# Patient Record
Sex: FEMALE | Race: WHITE | NOT HISPANIC OR LATINO | Employment: FULL TIME | URBAN - METROPOLITAN AREA
[De-identification: names, ages, dates, MRNs, and addresses within clinical notes are randomized per-mention and may not be internally consistent; named-entity substitution may affect disease eponyms.]

---

## 2018-03-19 ENCOUNTER — OFFICE VISIT (OUTPATIENT)
Dept: URGENT CARE | Facility: CLINIC | Age: 43
End: 2018-03-19
Payer: COMMERCIAL

## 2018-03-19 VITALS
BODY MASS INDEX: 35.44 KG/M2 | OXYGEN SATURATION: 98 % | TEMPERATURE: 98 F | DIASTOLIC BLOOD PRESSURE: 85 MMHG | HEIGHT: 63 IN | WEIGHT: 200 LBS | SYSTOLIC BLOOD PRESSURE: 137 MMHG | HEART RATE: 79 BPM

## 2018-03-19 DIAGNOSIS — J01.40 ACUTE PANSINUSITIS, RECURRENCE NOT SPECIFIED: Primary | ICD-10-CM

## 2018-03-19 PROCEDURE — 96372 THER/PROPH/DIAG INJ SC/IM: CPT | Mod: S$GLB,,, | Performed by: FAMILY MEDICINE

## 2018-03-19 PROCEDURE — 99203 OFFICE O/P NEW LOW 30 MIN: CPT | Mod: 25,S$GLB,, | Performed by: NURSE PRACTITIONER

## 2018-03-19 RX ORDER — BETAMETHASONE SODIUM PHOSPHATE AND BETAMETHASONE ACETATE 3; 3 MG/ML; MG/ML
9 INJECTION, SUSPENSION INTRA-ARTICULAR; INTRALESIONAL; INTRAMUSCULAR; SOFT TISSUE
Status: COMPLETED | OUTPATIENT
Start: 2018-03-19 | End: 2018-03-19

## 2018-03-19 RX ORDER — AMOXICILLIN AND CLAVULANATE POTASSIUM 875; 125 MG/1; MG/1
1 TABLET, FILM COATED ORAL 2 TIMES DAILY
Qty: 14 TABLET | Refills: 0 | Status: SHIPPED | OUTPATIENT
Start: 2018-03-19 | End: 2018-03-26

## 2018-03-19 RX ORDER — BENZONATATE 200 MG/1
200 CAPSULE ORAL 3 TIMES DAILY PRN
Qty: 30 CAPSULE | Refills: 0 | Status: SHIPPED | OUTPATIENT
Start: 2018-03-19 | End: 2019-03-19

## 2018-03-19 RX ORDER — PRAVASTATIN SODIUM 20 MG/1
20 TABLET ORAL DAILY
COMMUNITY

## 2018-03-19 RX ADMIN — BETAMETHASONE SODIUM PHOSPHATE AND BETAMETHASONE ACETATE 9 MG: 3; 3 INJECTION, SUSPENSION INTRA-ARTICULAR; INTRALESIONAL; INTRAMUSCULAR; SOFT TISSUE at 06:03

## 2018-03-19 NOTE — PROGRESS NOTES
"Subjective:       Patient ID: Beatriz Neville is a 42 y.o. female.    Vitals:  height is 5' 3" (1.6 m) and weight is 90.7 kg (200 lb). Her temperature is 98.4 °F (36.9 °C). Her blood pressure is 137/85 and her pulse is 79. Her oxygen saturation is 98%.     Chief Complaint: Sinus Problem    Sinus Problem   This is a new problem. The current episode started 1 to 4 weeks ago. The problem has been gradually worsening since onset. There has been no fever. Associated symptoms include congestion, coughing, ear pain, headaches, a hoarse voice, sinus pressure, sneezing, a sore throat and swollen glands. Pertinent negatives include no chills or shortness of breath. Past treatments include spray decongestants (mucinex/flonase/ibuprofen ). The treatment provided no relief.     Review of Systems   Constitution: Negative for chills, fever and malaise/fatigue.   HENT: Positive for congestion, ear pain, hoarse voice, sinus pressure, sneezing and sore throat.    Eyes: Negative for discharge and redness.   Cardiovascular: Negative for chest pain, dyspnea on exertion and leg swelling.   Respiratory: Positive for cough and sputum production. Negative for shortness of breath and wheezing.    Musculoskeletal: Negative for myalgias.   Gastrointestinal: Negative for abdominal pain and nausea.   Neurological: Positive for headaches.   All other systems reviewed and are negative.      Objective:      Physical Exam   Constitutional: She is oriented to person, place, and time. She appears well-developed and well-nourished. She is cooperative.  Non-toxic appearance. She does not appear ill. No distress.   HENT:   Head: Normocephalic and atraumatic.   Right Ear: Hearing, external ear and ear canal normal. A middle ear effusion is present.   Left Ear: Hearing, external ear and ear canal normal. A middle ear effusion is present.   Nose: Mucosal edema and rhinorrhea present. No nasal deformity. No epistaxis. Right sinus exhibits maxillary sinus " tenderness and frontal sinus tenderness. Left sinus exhibits maxillary sinus tenderness and frontal sinus tenderness.   Mouth/Throat: Uvula is midline, oropharynx is clear and moist and mucous membranes are normal. No trismus in the jaw. Normal dentition. No uvula swelling. No posterior oropharyngeal erythema.   Eyes: Conjunctivae and lids are normal. No scleral icterus.   Sclera clear bilat   Neck: Trachea normal, full passive range of motion without pain and phonation normal. Neck supple.   Cardiovascular: Normal rate, regular rhythm, normal heart sounds, intact distal pulses and normal pulses.    Pulmonary/Chest: Effort normal and breath sounds normal. No respiratory distress.   Abdominal: Soft. Normal appearance and bowel sounds are normal. She exhibits no distension. There is no tenderness.   Musculoskeletal: Normal range of motion. She exhibits no edema or deformity.   Lymphadenopathy:        Head (right side): Submandibular and preauricular adenopathy present. No submental, no tonsillar and no posterior auricular adenopathy present.        Head (left side): Submandibular and preauricular adenopathy present. No submental, no tonsillar and no posterior auricular adenopathy present.   Neurological: She is alert and oriented to person, place, and time. She exhibits normal muscle tone. Coordination normal.   Skin: Skin is warm, dry and intact. She is not diaphoretic. No pallor.   Psychiatric: She has a normal mood and affect. Her speech is normal and behavior is normal. Judgment and thought content normal. Cognition and memory are normal.   Nursing note and vitals reviewed.      Assessment:       1. Acute pansinusitis, recurrence not specified        Plan:         Acute pansinusitis, recurrence not specified    Other orders  -     amoxicillin-clavulanate 875-125mg (AUGMENTIN) 875-125 mg per tablet; Take 1 tablet by mouth 2 (two) times daily.  Dispense: 14 tablet; Refill: 0  -     benzonatate (TESSALON) 200 MG  capsule; Take 1 capsule (200 mg total) by mouth 3 (three) times daily as needed.  Dispense: 30 capsule; Refill: 0  -     betamethasone acetate-betamethasone sodium phosphate injection 9 mg; Inject 1.5 mLs (9 mg total) into the muscle one time.

## 2018-03-19 NOTE — PATIENT INSTRUCTIONS
Acute Bacterial Rhinosinusitis (ABRS)    Acute bacterial rhinosinusitis (ABRS) is an infection of your nasal cavity and sinuses. Its caused by bacteria. Acute means that youve had symptoms for less than 12 weeks.  Understanding your sinuses  The nasal cavity is the large air-filled space behind your nose. The sinuses are a group of spaces formed by the bones of your face. They connect with your nasal cavity. ABRS causes the tissue lining these spaces to become inflamed. Mucus may not drain normally. This leads to facial pain and other symptoms.  What causes ABRS?  ABRS most often follows an upper respiratory infection caused by a virus. Bacteria then infect the lining of your nasal cavity and sinuses. But you can also get ABRS if you have:  · Nasal allergies  · Long-term nasal swelling and congestion not caused by allergies  · Blockage in the nose  Symptoms of ABRS  The symptoms of ABRS may be different for each person, and can include:  · Nasal congestion  · Runny nose  · Fluid draining from the nose down the throat (postnasal drip)  · Headache  · Cough  · Pain in the sinuses  · Thick, colored fluid from the nose (mucus)  · Fever  Diagnosing ABRS  ABRS may be diagnosed if youve had an upper respiratory infection like a cold and cough for longer than 10 to 14 days. Your health care provider will ask about your symptoms and your medical history. The provider will check your vital signs, including your temperature. Youll have a physical exam. The health care provider will check your ears, nose, and throat. You likely wont need any tests. If ABRS comes back, you may have a culture or other tests.  Treatment for ABRS  Treatment may include:  · Antibiotic medicine. This is for symptoms that last for at least 10 to 14 days.  · Nasal corticosteroid medicine. Drops or spray used in the nose can lessen swelling and congestion.  · Over-the-counter pain medicine. This is to lessen sinus pain and pressure.  · Nasal  decongestant medicine. Spray or drops may help to lessen congestion. Do not use them for more than a few days.  · Salt wash (saline irrigation). This can help to loosen mucus.  Possible complications of ABRS  ABRS may come back or become long-term (chronic).  In rare cases, ABRS may cause complications such as:   · Inflamed tissue around the brain and spinal cord (meningitis)  · Inflamed tissue around the eyes (orbital cellulitis)  · Inflamed bones around the sinuses (osteitis)  These problems may need to be treated in a hospital with intravenous (IV) antibiotic medicine or surgery.  When to call the health care provider  Call your health care provider if you have any of the following:  · Symptoms that dont get better, or get worse  · Symptoms that dont get better after 3 to 5 days on antibiotics  · Trouble seeing  · Swelling around your eyes  · Confusion or trouble staying awake   Date Last Reviewed: 3/3/2015  © 2699-5550 [employer]. [address] [place] [address]. All rights reserved. This information is not intended as a substitute for professional medical care. Always follow your healthcare professional's instructions.    Symptomatic treatment:    Tylenol every 4 hours  Ibuprofen ever 6 hours  salt water gargles  Cold-eeze helps to reduce the duration of sore throat symptoms  Cepachol helps to numb the discomfort  Chloroseptic spray  Nasal saline spray reduces inflammation and dryness  Warm face compresses as often as you can  Vicks vapor rub at night  Flonase OTC or Nasacort OTC  Simple foods like chicken noodle soup help  Delsym helps with coughing at night  Zyrtec/Claritin during the day and Benadryl at night may help if allergy component   Rest as much as you can                                                          If we discussed that I think your illness is viral it will not respond to antibiotics and it will last 10-14 days.  Flonase (fluticasone) is a nasal spray which is  "available over the counter and may help with your symptoms   Zyrtec D, Claritin D or allegra D can also help with symptoms of congestion and drainage.   If you have hypertension avoid using the "D" which is the decongestant   If you just have drainage you can take plain zyrtec, claritin or allegra   If you just have a congested feeling you can take pseudoephedrine (unless you have high blood pressure) which you have to sign for behind the counter. Do not buy the phenylephrine which is on the shelf as it is not effective   Tylenol or ibuprofen can also be used as directed for pain unless you have an allergy to them or medical condition such as stomach ulcers, kidney or liver disease or blood thinners etc for which you should not be taking these type of medications.   If you are flying in the next few days Afrin nose drops for the airplane flight upon take off and landing may help. Other than at those times refrain from using afrin.       Please arrange follow up with your primary medical clinic as soon as possible. You must understand that you've received an Urgent Care treatment only and that you may be released before all of your medical problems are known or treated. You, the patient, will arrange for follow up as instructed. If your symptoms worsen or fail to improve you should go to the Emergency Room.      "

## 2023-07-21 ENCOUNTER — HOSPITAL ENCOUNTER (EMERGENCY)
Facility: HOSPITAL | Age: 48
Discharge: HOME | End: 2023-07-21
Attending: INTERNAL MEDICINE

## 2023-07-21 VITALS
SYSTOLIC BLOOD PRESSURE: 185 MMHG | WEIGHT: 205 LBS | DIASTOLIC BLOOD PRESSURE: 90 MMHG | TEMPERATURE: 98 F | HEART RATE: 76 BPM | OXYGEN SATURATION: 100 % | RESPIRATION RATE: 18 BRPM | BODY MASS INDEX: 36.32 KG/M2 | HEIGHT: 63 IN

## 2023-07-21 DIAGNOSIS — R00.2 PALPITATIONS: ICD-10-CM

## 2023-07-21 DIAGNOSIS — I10 HYPERTENSION, UNSPECIFIED TYPE: Primary | ICD-10-CM

## 2023-07-21 LAB
ALBUMIN SERPL-MCNC: 4.3 G/DL (ref 3.3–5.5)
ALP SERPL-CCNC: 73 U/L (ref 42–141)
B-HCG UR QL: NEGATIVE
BILIRUB SERPL-MCNC: 1 MG/DL (ref 0.2–1.6)
BUN SERPL-MCNC: 12 MG/DL (ref 7–22)
CALCIUM SERPL-MCNC: 9.9 MG/DL (ref 8–10.3)
CHLORIDE SERPL-SCNC: 104 MMOL/L (ref 98–108)
CREAT SERPL-MCNC: 0.7 MG/DL (ref 0.6–1.2)
CTP QC/QA: YES
GLUCOSE SERPL-MCNC: 106 MG/DL (ref 73–118)
POC ALT (SGPT): 19 U/L (ref 10–47)
POC AST (SGOT): 23 U/L (ref 11–38)
POC B-TYPE NATRIURETIC PEPTIDE: 19 PG/ML (ref 0–100)
POC CARDIAC TROPONIN I: 0 NG/ML (ref 0–0.08)
POC TCO2: 27 MMOL/L (ref 18–33)
POTASSIUM BLD-SCNC: 3.7 MMOL/L (ref 3.6–5.1)
PROTEIN, POC: 7.8 G/DL (ref 6.4–8.1)
SAMPLE: NORMAL
SODIUM BLD-SCNC: 142 MMOL/L (ref 128–145)

## 2023-07-21 PROCEDURE — 81025 URINE PREGNANCY TEST: CPT | Mod: ER

## 2023-07-21 PROCEDURE — 84484 ASSAY OF TROPONIN QUANT: CPT | Mod: ER

## 2023-07-21 PROCEDURE — 93010 ELECTROCARDIOGRAM REPORT: CPT | Mod: ,,, | Performed by: INTERNAL MEDICINE

## 2023-07-21 PROCEDURE — 99284 EMERGENCY DEPT VISIT MOD MDM: CPT | Mod: 25,ER

## 2023-07-21 PROCEDURE — 25000003 PHARM REV CODE 250: Mod: ER | Performed by: INTERNAL MEDICINE

## 2023-07-21 PROCEDURE — 80053 COMPREHEN METABOLIC PANEL: CPT | Mod: ER

## 2023-07-21 PROCEDURE — 83880 ASSAY OF NATRIURETIC PEPTIDE: CPT | Mod: ER

## 2023-07-21 PROCEDURE — 93010 EKG 12-LEAD: ICD-10-PCS | Mod: ,,, | Performed by: INTERNAL MEDICINE

## 2023-07-21 PROCEDURE — 81025 URINE PREGNANCY TEST: CPT | Mod: ER | Performed by: NURSE PRACTITIONER

## 2023-07-21 PROCEDURE — 93005 ELECTROCARDIOGRAM TRACING: CPT | Mod: ER

## 2023-07-21 RX ORDER — LOSARTAN POTASSIUM 50 MG/1
50 TABLET ORAL DAILY
Qty: 90 TABLET | Refills: 3 | Status: SHIPPED | OUTPATIENT
Start: 2023-07-21 | End: 2024-07-20

## 2023-07-21 RX ORDER — LOSARTAN POTASSIUM 25 MG/1
50 TABLET ORAL
Status: COMPLETED | OUTPATIENT
Start: 2023-07-21 | End: 2023-07-21

## 2023-07-21 RX ADMIN — LOSARTAN POTASSIUM 50 MG: 25 TABLET, FILM COATED ORAL at 07:07

## 2023-07-22 NOTE — ED PROVIDER NOTES
Encounter Date: 2023    SCRIBE #1 NOTE: I, Yordan Calderon, am scribing for, and in the presence of,  Ted Hays MD. I have scribed the following portions of the note - Other sections scribed: HPI, ROS, PE.     History     Chief Complaint   Patient presents with    Hypertension     Pt report HTN since this morning. Pt checked BP twice with elevated BP. Adds palpation and hot flashes for the past 3 days. Denies hx of HTN. Denies n/v/diarrhea.      48 year old female with no significant past medical history presenting to the Emergency room for further evaluation of light headedness, generalized malaise, and subjective fever for 3 days. The patient states she has had a few elevated BP readings while at DR's visits, but she is not on any BP medications. No PCP. No other exacerbating or alleviating factors. NKDA. Denies other symptoms. No further complaints at present time.     The history is provided by the patient. No  was used.   Review of patient's allergies indicates:  No Known Allergies  Past Medical History:   Diagnosis Date    Hypothyroidism      Past Surgical History:   Procedure Laterality Date    APPENDECTOMY       SECTION      lt breast bx  2005    TONSILLECTOMY       No family history on file.  Social History     Tobacco Use    Smoking status: Never    Smokeless tobacco: Never   Substance Use Topics    Alcohol use: No    Drug use: No     Review of Systems   Constitutional:  Negative for activity change, appetite change, chills, fatigue and fever.        + Subjective fever  + Generalized malaise     HENT:  Negative for congestion, sore throat, trouble swallowing and voice change.    Eyes:  Negative for photophobia, pain, redness and visual disturbance.   Respiratory:  Negative for cough, chest tightness, shortness of breath and wheezing.    Cardiovascular:  Negative for chest pain, palpitations and leg swelling.   Gastrointestinal:  Negative for abdominal distention,  abdominal pain, anal bleeding, constipation, diarrhea, nausea and vomiting.   Endocrine: Negative for polydipsia, polyphagia and polyuria.   Genitourinary:  Negative for difficulty urinating, dysuria, frequency, hematuria, urgency, vaginal bleeding and vaginal discharge.   Musculoskeletal:  Negative for arthralgias and myalgias.   Skin:  Negative for rash and wound.   Neurological:  Positive for light-headedness. Negative for dizziness, weakness and headaches.   Hematological:  Negative for adenopathy.     Physical Exam     Initial Vitals   BP Pulse Resp Temp SpO2   07/21/23 1635 07/21/23 1635 07/21/23 1635 07/21/23 1636 07/21/23 1635   (!) 186/120 97 18 98.3 °F (36.8 °C) 100 %      MAP       --                Physical Exam    Nursing note and vitals reviewed.  Constitutional: She appears well-developed and well-nourished.   HENT:   Head: Normocephalic and atraumatic.   Right Ear: External ear normal.   Left Ear: External ear normal.   Eyes: Conjunctivae are normal.   Neck: Phonation normal. Neck supple.   Normal range of motion.  Cardiovascular:  Normal rate and intact distal pulses.           Pulmonary/Chest: Effort normal. No stridor. No respiratory distress.   Abdominal: Abdomen is soft. There is no abdominal tenderness.   Musculoskeletal:         General: No tenderness or edema.      Cervical back: Normal range of motion and neck supple.     Neurological: She is alert and oriented to person, place, and time.   Skin: Skin is warm and dry. No rash noted.   Psychiatric: She has a normal mood and affect. Her behavior is normal.       ED Course   Procedures  Labs Reviewed   TROPONIN ISTAT   POCT URINE PREGNANCY   POCT CBC   POCT CMP   POCT TROPONIN   POCT B-TYPE NATRIURETIC PEPTIDE (BNP)   POCT CMP   POCT B-TYPE NATRIURETIC PEPTIDE (BNP)          Imaging Results              X-Ray Chest PA And Lateral (Final result)  Result time 07/21/23 17:56:29      Final result by Edilberto Condon MD (07/21/23 17:56:29)                    Impression:      1. No acute cardiopulmonary process.      Electronically signed by: Edilberto Condon MD  Date:    07/21/2023  Time:    17:56               Narrative:    EXAMINATION:  XR CHEST PA AND LATERAL    CLINICAL HISTORY:  Palpitations    TECHNIQUE:  PA and lateral views of the chest were performed.    COMPARISON:  None    FINDINGS:  The cardiomediastinal silhouette is not enlarged.  There is no pleural effusion.  The trachea is midline.  The lungs are symmetrically expanded bilaterally without evidence of acute parenchymal process. No large focal consolidation seen.  There is no pneumothorax.  The osseous structures are unremarkable.                                       Medications   losartan tablet 50 mg (50 mg Oral Given 7/21/23 1953)     Medical Decision Making:   History:   Old Medical Records: I decided to obtain old medical records.  Initial Assessment:   48 year old female with no significant past medical history presenting to the Emergency room for further evaluation of light headedness, generalized malaise, and subjective fever for 3 days. The patient states she has had a few elevated BP readings while at DR's visits, but she is not on any BP medications. No PCP. No other exacerbating or alleviating factors. NKDA. Denies other symptoms. No further complaints at present time.   Clinical Tests:   Lab Tests: Ordered and Reviewed  Radiological Study: Ordered and Reviewed  Medical Tests: Ordered and Reviewed  ED Management:  Initial blood pressure was recorded at 186/120.  Repeat blood pressure upon arrival to and TM room was 185/90.  During history, patient stated she received at least 2 elevated blood pressure readings from specialty clinics, but was never started on medication.  Patient was given a prescription for losartan and advised to follow-up with her primary care physician within the next 3 days for re-evaluation/return to the emergency department if condition worsens.  Instructions  for hypertension were given prior to discharge.      This document was produced by a scribe under my direction and in my presence. I agree with the content of the note and have made any necessary edits.     Dr. Hays    07/22/2023 4:04 AM        Scribe Attestation:   Scribe #1: I performed the above scribed service and the documentation accurately describes the services I performed. I attest to the accuracy of the note.                   Clinical Impression:   Final diagnoses:  [R00.2] Palpitations  [I10] Hypertension, unspecified type (Primary)        ED Disposition Condition    Discharge Stable          ED Prescriptions       Medication Sig Dispense Start Date End Date Auth. Provider    losartan (COZAAR) 50 MG tablet Take 1 tablet (50 mg total) by mouth once daily. 90 tablet 7/21/2023 7/20/2024 Ted Hays MD          Follow-up Information    None          Ted Hays MD  07/22/23 [id number]

## 2023-07-22 NOTE — DISCHARGE INSTRUCTIONS
Follow-up with your primary care physician within the next week for re-evaluation and return to the emergency department condition worsens.

## 2023-07-22 NOTE — ED TRIAGE NOTES
Beatriz Neville, a 48 y.o. female presents to the ED w/ complaint of having high blood pressure readings starting this morning.  She was having hot flashes and headache and took her pressure with readings of 168/104 and the second one was even higher.  She denies any h/o hypertension and she denies chest pain or sob.     Triage note:  Chief Complaint   Patient presents with    Hypertension     Pt report HTN since this morning. Pt checked BP twice with elevated BP. Adds palpation and hot flashes for the past 3 days. Denies hx of HTN. Denies n/v/diarrhea.      Review of patient's allergies indicates:  No Known Allergies  Past Medical History:   Diagnosis Date    Hypothyroidism

## 2024-09-24 ENCOUNTER — OFFICE VISIT (OUTPATIENT)
Dept: OBSTETRICS AND GYNECOLOGY | Facility: CLINIC | Age: 49
End: 2024-09-24
Payer: COMMERCIAL

## 2024-09-24 VITALS
BODY MASS INDEX: 36.28 KG/M2 | WEIGHT: 204.81 LBS | DIASTOLIC BLOOD PRESSURE: 83 MMHG | SYSTOLIC BLOOD PRESSURE: 130 MMHG

## 2024-09-24 DIAGNOSIS — Z12.39 ENCOUNTER FOR SCREENING FOR MALIGNANT NEOPLASM OF BREAST, UNSPECIFIED SCREENING MODALITY: ICD-10-CM

## 2024-09-24 DIAGNOSIS — Z01.419 WELL WOMAN EXAM WITH ROUTINE GYNECOLOGICAL EXAM: Primary | ICD-10-CM

## 2024-09-24 DIAGNOSIS — Z12.4 CERVICAL CANCER SCREENING: ICD-10-CM

## 2024-09-24 DIAGNOSIS — N92.0 MENORRHAGIA WITH REGULAR CYCLE: ICD-10-CM

## 2024-09-24 PROCEDURE — 99999 PR PBB SHADOW E&M-EST. PATIENT-LVL III: CPT | Mod: PBBFAC,,, | Performed by: OBSTETRICS & GYNECOLOGY

## 2024-09-24 PROCEDURE — 3079F DIAST BP 80-89 MM HG: CPT | Mod: CPTII,S$GLB,, | Performed by: OBSTETRICS & GYNECOLOGY

## 2024-09-24 PROCEDURE — 3075F SYST BP GE 130 - 139MM HG: CPT | Mod: CPTII,S$GLB,, | Performed by: OBSTETRICS & GYNECOLOGY

## 2024-09-24 PROCEDURE — 3008F BODY MASS INDEX DOCD: CPT | Mod: CPTII,S$GLB,, | Performed by: OBSTETRICS & GYNECOLOGY

## 2024-09-24 PROCEDURE — 4010F ACE/ARB THERAPY RXD/TAKEN: CPT | Mod: CPTII,S$GLB,, | Performed by: OBSTETRICS & GYNECOLOGY

## 2024-09-24 PROCEDURE — 1159F MED LIST DOCD IN RCRD: CPT | Mod: CPTII,S$GLB,, | Performed by: OBSTETRICS & GYNECOLOGY

## 2024-09-24 PROCEDURE — 99396 PREV VISIT EST AGE 40-64: CPT | Mod: S$GLB,,, | Performed by: OBSTETRICS & GYNECOLOGY

## 2024-09-24 RX ORDER — METOPROLOL SUCCINATE 25 MG/1
25 TABLET, EXTENDED RELEASE ORAL DAILY
COMMUNITY
Start: 2024-07-10

## 2024-09-24 RX ORDER — HYDROCHLOROTHIAZIDE 25 MG/1
25 TABLET ORAL
COMMUNITY
Start: 2024-08-08

## 2024-09-24 NOTE — PROGRESS NOTES
HISTORY OF PRESENT ILLNESS:    Beatriz Neville is a 49 y.o. female, , Patient's last menstrual period was 09/10/2024 (exact date).,  presents for a routine annual exam . Complaining of menorrhagia ,with clots ,occ messes up clots   Last pap:  1 year   Contraception: tubal ligation.    Sexually transmitted infection risk: no risk  Menstrual flow: regular every 28-30 days.  This is the extent of the patient's complaints at this time.     Past Medical History:   Diagnosis Date    Hypothyroidism        Past Surgical History:   Procedure Laterality Date    APPENDECTOMY       SECTION      lt breast bx  2005    TONSILLECTOMY         MEDICATIONS AND ALLERGIES:      Current Outpatient Medications:     hydroCHLOROthiazide (HYDRODIURIL) 25 MG tablet, Take 25 mg by mouth., Disp: , Rfl:     levothyroxine (SYNTHROID) 75 MCG tablet, Take 75 mcg by mouth once daily., Disp: , Rfl:     losartan (COZAAR) 50 MG tablet, Take 1 tablet (50 mg total) by mouth once daily., Disp: 90 tablet, Rfl: 3    metoprolol succinate (TOPROL-XL) 25 MG 24 hr tablet, Take 25 mg by mouth once daily., Disp: , Rfl:     pravastatin (PRAVACHOL) 20 MG tablet, Take 20 mg by mouth once daily., Disp: , Rfl:     fluticasone (FLONASE) 50 mcg/actuation nasal spray, One to two sprays each nostril daily (Patient not taking: Reported on 2024), Disp: 16 g, Rfl: 1    loratadine (CLARITIN) 10 mg tablet, Take 1 tablet (10 mg total) by mouth once daily. (Patient not taking: Reported on 2024), Disp: 30 tablet, Rfl: 0    Review of patient's allergies indicates:  No Known Allergies    Family History   Problem Relation Name Age of Onset    Lung cancer Paternal Grandfather      Lung cancer Paternal Grandmother         Social History     Socioeconomic History    Marital status:    Tobacco Use    Smoking status: Never    Smokeless tobacco: Never   Substance and Sexual Activity    Alcohol use: No    Drug use: No    Sexual activity: Yes     Partners: Male      Social Determinants of Health     Financial Resource Strain: Low Risk  (2024)    Received from [facility]    Overall Financial Resource Strain (CARDIA)     Difficulty of Paying Living Expenses: Not very hard   Food Insecurity: No Food Insecurity (2024)    Received from [facility]    Hunger Vital Sign     Worried About Running Out of Food in the Last Year: Never true     Ran Out of Food in the Last Year: Never true   Transportation Needs: No Transportation Needs (2024)    Received from [facility]    PRAPARE - Transportation     Lack of Transportation (Medical): No     Lack of Transportation (Non-Medical): No   Physical Activity: Inactive (2024)    Received from [facility]    Exercise Vital Sign     Days of Exercise per Week: 0 days     Minutes of Exercise per Session: 0 min   Stress: Stress Concern Present (2024)    Received from [facility]    [place] [employer] - Occupational Stress Questionnaire     Feeling of Stress : Very much       OB History    Para Term  AB Living   3 3 3     3   SAB IAB Ectopic Multiple Live Births           3      # Outcome Date GA Lbr Thiago/2nd Weight Sex Type Anes PTL Lv   3 Term 2010    F CS-Unspec  N FRANTZ   2 Term 2006    M CS-Unspec  N FRANTZ   1 Term 2004    F CS-Unspec  N FRANTZ          COMPREHENSIVE GYN HISTORY:  PAP History: Denies abnormal Paps.  Infection History: Denies STDs. Denies PID.  Benign History: Denies uterine fibroids. Denies ovarian cysts. Denies endometriosis. Denies other conditions.  Cancer History: Denies cervical cancer. Denies uterine cancer or hyperplasia. Denies ovarian cancer. Denies vulvar cancer or pre-cancer. Denies vaginal cancer or pre-cancer. Denies breast cancer. Denies colon cancer.      ROS:  GENERAL: No weight changes. No swelling. No fatigue. No fever.  BREASTS: No pain. No lumps. No discharge.  ABDOMEN: No pain. No nausea. No vomiting. No diarrhea. No constipation.  REPRODUCTIVE: No abnormal  bleeding. No pelvic pain.   VULVA: No pain. No lesions. No itching.  VAGINA: No relaxation. No itching. No odor. No discharge. No lesions.  URINARY: No incontinence. No nocturia. No frequency. No dysuria.    /83   Wt 92.9 kg (204 lb 12.9 oz)   LMP 09/10/2024 (Exact Date)   BMI 36.28 kg/m²     PE:  Physical Exam   Vitals signs and nursing note reviewed.   Constitutional:       Appearance: Normal appearance.   HENT:      Head: Normocephalic and atraumatic.      Nose: Nose normal.      Mouth/Throat:      Mouth: Mucous membranes are moist.   Eyes:      Conjunctiva/sclera: Conjunctivae normal.   Neck:      Musculoskeletal: Normal range of motion.   Pulmonary:      Effort: Pulmonary effort is normal.   Breast: no masses palpated  bilateral    Abdominal:      Palpations: Abdomen is soft.   Genitourinary:     General: Normal vulva.      Vagina: No signs of injury and foreign body. No vaginal discharge, erythema, tenderness, bleeding, lesions or prolapsed vaginal walls.      Cervix: Normal. Pap taken     Uterus: Normal.       Adnexa: Right adnexa normal and left adnexa normal.      Musculoskeletal: Normal range of motion.   Skin:     General: Skin is warm and dry.   Neurological:      General: No focal deficit present.      Mental Status: She is alert.   Psychiatric:         Mood and Affect: Mood normal.         Behavior: Behavior normal.         Thought Content: Thought content normal.         Judgment: Judgment normal.    PROCEDURES/ORDERS:  Pap      Assessment/Plan:    Well woman exam with routine gynecological exam    Cervical cancer screening    Encounter for screening for malignant neoplasm of breast, unspecified screening modality    Menorrhagia with regular cycle              COUNSELING:  The patient was counseled today on:  -A.C.S. Pap and pelvic exam guidelines, recomendations for yearly mammogram, monthly self breast exams and to follow up with her PCP for other health maintenance.    FOLLOW-UP  annually  for [place].

## 2024-10-30 ENCOUNTER — PATIENT MESSAGE (OUTPATIENT)
Dept: OBSTETRICS AND GYNECOLOGY | Facility: CLINIC | Age: 49
End: 2024-10-30
Payer: COMMERCIAL

## 2025-02-24 LAB — BCS RECOMMENDATION EXT: NORMAL

## 2025-03-06 ENCOUNTER — PATIENT OUTREACH (OUTPATIENT)
Dept: ADMINISTRATIVE | Facility: HOSPITAL | Age: 50
End: 2025-03-06
Payer: COMMERCIAL

## 2025-03-06 ENCOUNTER — OFFICE VISIT (OUTPATIENT)
Dept: FAMILY MEDICINE | Facility: CLINIC | Age: 50
End: 2025-03-06
Payer: COMMERCIAL

## 2025-03-06 VITALS
HEIGHT: 63 IN | TEMPERATURE: 99 F | HEART RATE: 68 BPM | BODY MASS INDEX: 38.01 KG/M2 | OXYGEN SATURATION: 97 % | DIASTOLIC BLOOD PRESSURE: 76 MMHG | WEIGHT: 214.5 LBS | SYSTOLIC BLOOD PRESSURE: 134 MMHG

## 2025-03-06 DIAGNOSIS — E03.9 HYPOTHYROIDISM, UNSPECIFIED TYPE: ICD-10-CM

## 2025-03-06 DIAGNOSIS — Z12.31 SCREENING MAMMOGRAM FOR BREAST CANCER: Primary | ICD-10-CM

## 2025-03-06 DIAGNOSIS — I10 HYPERTENSION, UNSPECIFIED TYPE: Primary | ICD-10-CM

## 2025-03-06 DIAGNOSIS — Z12.11 SCREENING FOR COLON CANCER: ICD-10-CM

## 2025-03-06 DIAGNOSIS — E78.5 HYPERLIPIDEMIA, UNSPECIFIED HYPERLIPIDEMIA TYPE: ICD-10-CM

## 2025-03-06 PROCEDURE — 99204 OFFICE O/P NEW MOD 45 MIN: CPT | Mod: S$GLB,,, | Performed by: FAMILY MEDICINE

## 2025-03-06 PROCEDURE — 1160F RVW MEDS BY RX/DR IN RCRD: CPT | Mod: CPTII,S$GLB,, | Performed by: FAMILY MEDICINE

## 2025-03-06 PROCEDURE — 1159F MED LIST DOCD IN RCRD: CPT | Mod: CPTII,S$GLB,, | Performed by: FAMILY MEDICINE

## 2025-03-06 PROCEDURE — 3078F DIAST BP <80 MM HG: CPT | Mod: CPTII,S$GLB,, | Performed by: FAMILY MEDICINE

## 2025-03-06 PROCEDURE — 3008F BODY MASS INDEX DOCD: CPT | Mod: CPTII,S$GLB,, | Performed by: FAMILY MEDICINE

## 2025-03-06 PROCEDURE — 3075F SYST BP GE 130 - 139MM HG: CPT | Mod: CPTII,S$GLB,, | Performed by: FAMILY MEDICINE

## 2025-03-06 PROCEDURE — 4010F ACE/ARB THERAPY RXD/TAKEN: CPT | Mod: CPTII,S$GLB,, | Performed by: FAMILY MEDICINE

## 2025-03-06 PROCEDURE — 99999 PR PBB SHADOW E&M-EST. PATIENT-LVL III: CPT | Mod: PBBFAC,,, | Performed by: FAMILY MEDICINE

## 2025-03-06 RX ORDER — LEVOTHYROXINE SODIUM 175 UG/1
175 TABLET ORAL
COMMUNITY

## 2025-03-06 RX ORDER — HYDROCHLOROTHIAZIDE 25 MG/1
25 TABLET ORAL DAILY
Qty: 90 TABLET | Refills: 1 | Status: SHIPPED | OUTPATIENT
Start: 2025-03-06

## 2025-03-06 RX ORDER — METOPROLOL SUCCINATE 25 MG/1
25 TABLET, EXTENDED RELEASE ORAL DAILY
Qty: 90 TABLET | Refills: 1 | Status: SHIPPED | OUTPATIENT
Start: 2025-03-06

## 2025-03-06 RX ORDER — LOSARTAN POTASSIUM 100 MG/1
100 TABLET ORAL DAILY
COMMUNITY
End: 2025-03-06 | Stop reason: SDUPTHER

## 2025-03-06 RX ORDER — LOSARTAN POTASSIUM 100 MG/1
100 TABLET ORAL DAILY
Qty: 90 TABLET | Refills: 1 | Status: SHIPPED | OUTPATIENT
Start: 2025-03-06

## 2025-03-06 RX ORDER — PRAVASTATIN SODIUM 40 MG/1
40 TABLET ORAL DAILY
COMMUNITY

## 2025-03-06 RX ORDER — POTASSIUM CHLORIDE 750 MG/1
CAPSULE, EXTENDED RELEASE ORAL
COMMUNITY
Start: 2025-02-05

## 2025-03-06 NOTE — PROGRESS NOTES
"Subjective     Patient ID: Beatriz Neville is a 49 y.o. female.    Chief Complaint: Establish Care and Hypertension    49 year old female presentse for an annual exam and to establish care.     She got on blodo presrue medication 2 summers ago.     Hypertension  This is a chronic problem. The current episode started more than 1 year ago. The problem is controlled (130s/70's-80s). Pertinent negatives include no chest pain, headaches, palpitations or shortness of breath.     Last mammogram was 25. He has had a mammogram with an ultrasound she she gets both every 6 months. She was just told that she can go back to yearly visits.     She had her papsmear in 2024 with Dr. Thapa  Colonsocopy done in  with Dr. Long.      History of Present Illness               Past Medical History:   Diagnosis Date    Hypothyroidism       Past Surgical History:   Procedure Laterality Date    APPENDECTOMY       SECTION      x3    lt breast bx  2005    cyst removed    THYROIDECTOMY, PARTIAL  1999    removed for benign nodules    TONSILLECTOMY       Family History   Problem Relation Name Age of Onset    Diabetes type II Mother      Deep vein thrombosis Mother          likely from PE    Thyroid disease Father      No Known Problems Sister      No Known Problems Brother      Lung cancer Paternal Grandmother      Lung cancer Paternal Grandfather       Social History[1]    Review of Systems   Constitutional:  Negative for fatigue.   Respiratory:  Negative for cough, chest tightness and shortness of breath.    Cardiovascular:  Negative for chest pain, palpitations and leg swelling.   Gastrointestinal:  Negative for abdominal pain.   Neurological:  Negative for dizziness, syncope, light-headedness and headaches.            Objective     Vitals:    25 0908   BP: 134/76   Pulse: 68   Temp: 98.6 °F (37 °C)   TempSrc: Oral   SpO2: 97%   Weight: 97.3 kg (214 lb 8.1 oz)   Height: 5' 3" (1.6 m)        Physical " Exam  Constitutional:       General: She is not in acute distress.     Appearance: She is well-developed. She is not diaphoretic.   HENT:      Head: Normocephalic.      Right Ear: External ear normal.      Left Ear: External ear normal.      Mouth/Throat:      Pharynx: No oropharyngeal exudate.   Eyes:      Conjunctiva/sclera: Conjunctivae normal.   Neck:      Thyroid: No thyromegaly.   Cardiovascular:      Rate and Rhythm: Normal rate and regular rhythm.      Heart sounds: Normal heart sounds. No murmur heard.     No friction rub. No gallop.   Pulmonary:      Effort: Pulmonary effort is normal. No respiratory distress.      Breath sounds: Normal breath sounds. No stridor. No wheezing, rhonchi or rales.   Abdominal:      General: Bowel sounds are normal. There is no distension.      Palpations: Abdomen is soft. There is no mass.      Tenderness: There is no abdominal tenderness. There is no guarding or rebound.      Hernia: No hernia is present.   Musculoskeletal:      Cervical back: Normal range of motion and neck supple.   Lymphadenopathy:      Cervical: No cervical adenopathy.   Skin:     Findings: No rash.   Neurological:      General: No focal deficit present.      Mental Status: She is alert and oriented to person, place, and time.   Psychiatric:         Mood and Affect: Mood normal.         Behavior: Behavior normal.       Physical Exam                Assessment and Plan     1. Hypertension, unspecified type  -     CBC Auto Differential; Future; Expected date: 03/06/2025  -     Comprehensive Metabolic Panel; Future; Expected date: 03/06/2025  -     Lipid Panel; Future; Expected date: 03/06/2025  -     metoprolol succinate (TOPROL-XL) 25 MG 24 hr tablet; Take 1 tablet (25 mg total) by mouth once daily.  Dispense: 90 tablet; Refill: 1  -     losartan (COZAAR) 100 MG tablet; Take 1 tablet (100 mg total) by mouth once daily.  Dispense: 90 tablet; Refill: 1  -     hydroCHLOROthiazide (HYDRODIURIL) 25 MG tablet;  Take 1 tablet (25 mg total) by mouth once daily.  Dispense: 90 tablet; Refill: 1  Stable. Refilled meds and due for labs    2. Hyperlipidemia, unspecified hyperlipidemia type  -     CBC Auto Differential; Future; Expected date: 03/06/2025  -     Comprehensive Metabolic Panel; Future; Expected date: 03/06/2025  -     Lipid Panel; Future; Expected date: 03/06/2025  -     Hemoglobin A1C; Future; Expected date: 03/06/2025  Stable and due for labs     3. Hypothyroidism, unspecified type  -     Hemoglobin A1C; Future; Expected date: 03/06/2025  -     TSH; Future; Expected date: 03/06/2025      Beatriz was seen today for establish care and hypertension.    Diagnoses and all orders for this visit:    Hypertension, unspecified type  -     CBC Auto Differential; Future  -     Comprehensive Metabolic Panel; Future  -     Lipid Panel; Future  -     metoprolol succinate (TOPROL-XL) 25 MG 24 hr tablet; Take 1 tablet (25 mg total) by mouth once daily.  -     losartan (COZAAR) 100 MG tablet; Take 1 tablet (100 mg total) by mouth once daily.  -     hydroCHLOROthiazide (HYDRODIURIL) 25 MG tablet; Take 1 tablet (25 mg total) by mouth once daily.    Hyperlipidemia, unspecified hyperlipidemia type  -     CBC Auto Differential; Future  -     Comprehensive Metabolic Panel; Future  -     Lipid Panel; Future  -     Hemoglobin A1C; Future    Hypothyroidism, unspecified type  -     Hemoglobin A1C; Future  -     TSH; Future        Assessment & Plan                      No follow-ups on file.        This note was generated with the assistance of ambient listening technology. Verbal consent was obtained by the patient and accompanying visitor(s) for the recording of patient appointment to facilitate this note. I attest to having reviewed and edited the generated note for accuracy, though some syntax or spelling errors may persist. Please contact the author of this note for any clarification.         [1]   Social History  Socioeconomic History     Marital status:     Number of children: 3   Tobacco Use    Smoking status: Never    Smokeless tobacco: Never   Substance and Sexual Activity    Alcohol use: No    Drug use: No    Sexual activity: Yes     Partners: Male   Social History Narrative     for 26 years. She works as an  at [employer]     Social Drivers of Health     Financial Resource Strain: Low Risk  (7/9/2024)    Received from [facility]    Overall Financial Resource Strain (CARDIA)     Difficulty of Paying Living Expenses: Not very hard   Food Insecurity: No Food Insecurity (7/9/2024)    Received from [facility]    Hunger Vital Sign     Worried About Running Out of Food in the Last Year: Never true     Ran Out of Food in the Last Year: Never true   Transportation Needs: No Transportation Needs (7/9/2024)    Received from [facility]    PRAPARE - Transportation     Lack of Transportation (Medical): No     Lack of Transportation (Non-Medical): No   Physical Activity: Inactive (7/9/2024)    Received from [facility]    Exercise Vital Sign     Days of Exercise per Week: 0 days     Minutes of Exercise per Session: 0 min   Stress: Stress Concern Present (7/9/2024)    Received from [facility]    [place] [facility] [employer] - Occupational Stress Questionnaire     Feeling of Stress : Very much

## 2025-03-06 NOTE — LETTER
AUTHORIZATION FOR RELEASE OF   CONFIDENTIAL INFORMATION        We are seeing Beatriz Neville, date of birth 1975, in the clinic at [facility]. Cecile Villarreal MD is the patient's PCP. Beatriz Neville has an outstanding lab/procedure at the time we reviewed her chart. In order to help keep her health information updated, she has authorized us to request the following medical record(s):                                            (  x)  COLONOSCOPY  (x) Pathology Report  Recommendation for repeat colonoscopy in ______ years        Please fax records to [facility], Rodgers, Shari J., MD,  at [phone] or email to [email or web address].              Patient Name: Beatriz Neville  : 1975  Patient Phone #: [phone]                    Beatriz Neville  MRN: [id number]  : 1975  Age: 49 y.o.  Sex: female         Patient/Legal Guardian Signature  This signature was collected at 2024    Beatriz Neville     Self  _______________________________   Printed Name/Relationship to Patient      Consent for Examination and Treatment: I hereby authorize the providers and employees of [facility] ([facility]) to provide medical treatment/services which includes, but is not limited to, performing and administering tests and diagnostic procedures that are deemed necessary, including, but not limited to, imaging examinations, blood tests and other laboratory procedures as may be required by the hospital, clinic, or may be ordered by my physician(s) or persons working under the general and/or special instructions of my physician(s).      I understand and agree that this consent covers all authorized persons, including but not limited to physicians, residents, nurse practitioners, physicians' assistants, specialists, consultants, student nurses, and independently contracted physicians, who are called upon by the physician in charge, to carry out the diagnostic procedures and  medical or surgical treatment.     I hereby authorize [facility] to retain or dispose of any specimens or tissue, should there be such remaining from any test or procedure.     I hereby authorize and give consent for [facility] providers and employees to take photographs, images or videotapes of such diagnostic, surgical or treatment procedures of Patient as may be required by [facility] or as may be ordered by a physician. I further acknowledge and agree that [facility] may use cameras or other devices for patient monitoring.     I am aware that the practice of medicine is not an exact science, and I acknowledge that no guarantees have been made to me as to the outcome of any tests, procedures or treatment.     Authorization for Release of Information: I understand that my insurance company and/or their agents may need information necessary to make determinations about payment/reimbursement. I hereby provide authorization to release to all insurance companies, their successors, assignees, other parties with whom they may have contracted, or others acting on their behalf, that are involved with payment for any hospital and/or clinic charges incurred by the patient, any information that they request and deem necessary for payment/reimbursement, and/or quality review.  I further authorize the release of my health information to physicians or other health care practitioners on staff who are involved in my health care now and in the future, and to other health care providers, entities, or institutions for the purpose of my continued care and treatment, including referrals.     REGISTRATION AUTHORIZATION  Form No. 32579 (Rev. 3/25/2024)    Page 1 of 3                       Medicare Patient's Certification and Authorization to Release Information and Payment Request:  I certify that the information given by me in applying for payment under Title XVIII of the Social Security Act is correct. I authorize any mustafa of medical or other  information about me to release to the Social [facility], or its intermediaries or carriers, any information needed for this or a related Medicare claim. I request that payment of authorized benefits be made on my behalf.     Assignment of Insurance Benefits:   I hereby authorize any and all insurance companies, health plans, defined   benefit plans, health insurers or any entity that is or may be responsible for payment of my medical expenses to pay all hospital and medical benefits now due, and to become due and payable to me under any hospital benefits, sick benefits, injury benefits or any other benefit for services rendered to me, including Major Medical Benefits, direct to [facility] and all independently contracted physicians. I assign any and all rights that I may have against any and all insurance companies, health plans, defined benefit plans, health insurers or any entity that is or may be responsible for payment of my medical expenses, including, but not limited to any right to appeal a denial of a claim, any right to bring any action, lawsuit, administrative proceeding, or other cause of action on my behalf. I specifically assign my right to pursue litigation against any and all insurance companies, health plans, defined benefit plans, health insurers or any entity that is or may be responsible for payment of my medical expenses based upon a refusal to pay charges.            E. Valuables: It is understood and agreed that [facility] is not liable for the damage to or loss of any money, jewelry,   documents, dentures, eye glasses, hearing aids, prosthetics, or other property of value.     F. Computer Equipment: I understand and agree that should I choose to use computer equipment owned by [facility] or if I choose to access the Internet via [facility]s network, I do so at my own risk. [facility] is not responsible for any damage to my computer equipment or to any damages of any type that might arise from  my loss of equipment or data.     G. Acceptance of Financial Responsibility:  I agree that in consideration of the services and   supplies that have been   or will be furnished to the patient, I am hereby obligated to pay all charges made for or on the account of the patient according to the standard rates (in effect at the time the services and supplies are delivered) established by [facility], including its Patient Financial Assistance Policy to the extent it is applicable. I understand that I am responsible for all charges, or portions thereof, not covered by insurance or other sources. Patient refunds will be distributed only after balances at all [facility] facilities are paid.     H. Communication Authorization:  I hereby authorize [facility] and its representatives, along with any billing service   or  who may work on their behalf, to contact me on   my cell phone and/or home phone using pre- recorded messages, artificial voice messages, automatic telephone dialing devices or other computer assisted technology, or by electronic      mail, text messaging, or by any other form of electronic communication. This includes, but is not limited to, appointment reminders, yearly physical exam reminders, preventive care reminders, patient campaigns, welcome calls, and calls about account balances on my account or any account on which I am listed as a guarantor. I understand I have the right to opt out of these communications at any time.      Relationship  Between  Facility and  Provider:      I understand that some, but not all, providers furnishing services to the patient are not employees or agents of [facility]. The patient is under the care and supervision of his/her attending physician, and it is the responsibility of the facility and its nursing staff to carry out the instructions of such physicians. It is the responsibility of the patient's physician/designee to obtain the patient's informed consent, when  required, for medical or surgical treatment, special diagnostic or therapeutic procedures, or hospital services rendered for the patient under the special instructions of the physician/designee.           REGISTRATION AUTHORIZATION  Form No. 91671 (Rev. 3/25/2024)    Page 2 of 3                       Immunizations: [facility] shares immunization information with state sponsored health departments to help you and your doctor keep track of your immunization records. By signing, you consent to have this information shared with the health department in your state:                                [place] - LINKS ([place] Immunization Network for Kids Statewide)                                [place] - MIIX ([place] Immunization Information eXchange)                                [place] - ImmPRINT (Immunization Patient Registry with Integrated Technology)     TERM: This authorization is valid for this and subsequent care/treatment I receive at [facility] and will remain valid unless/until revoked in writing by me.     [facility]: As used in this document, [facility] means all [facility] owned and managed facilities, including, but not limited to, all health centers, surgery centers, clinics, urgent care centers, and hospitals.         [facility] complies with applicable Federal civil rights laws and does not discriminate on the basis of race, color, national origin, age, disability, or sex.  ATENCIÓN: si habla michelineañol, tiene a prajapati disposición servicios gratuitos de asistencia lingüística. Wendy rivas [phone].  CHÚ Ý: N?u b?n nói Ti?ng Vi?t, có các d?ch v? h? tr? ngôn ng? mi?n phí dành cho b?n. G?i s? [phone].        REGISTRATION AUTHORIZATION

## 2025-03-06 NOTE — PROGRESS NOTES
Population Health Chart Review & Patient Outreach Details      Additional Pop Health Notes:    HM and immunization's reviewed and updated.  DUE FOR Mammo, Lipid, HgA1C, Colon Screening.IF ALREADY DONE, NEED TO GET RECORDS INFORMATION.  Place in visit note to advise.            Updates Requested / Reviewed:      Updated Care Coordination Note, Care Everywhere, and Care Team Updated         Health Maintenance Topics Overdue:      VBHM Score: 3     Colon Cancer Screening  Mammogram  Hemoglobin A1c                       Health Maintenance Topic(s) Outreach Outcomes & Actions Taken:    Lab(s) - Outreach Outcomes & Actions Taken  : Overdue Lab(s) Ordered    Breast Cancer Screening - Outreach Outcomes & Actions Taken  : Mammogram Order Placed    Colorectal Cancer Screening - Outreach Outcomes & Actions Taken  : Place in visit note to advise.

## 2025-03-11 ENCOUNTER — PATIENT OUTREACH (OUTPATIENT)
Dept: ADMINISTRATIVE | Facility: HOSPITAL | Age: 50
End: 2025-03-11
Payer: COMMERCIAL

## 2025-03-20 ENCOUNTER — LAB VISIT (OUTPATIENT)
Dept: LAB | Facility: HOSPITAL | Age: 50
End: 2025-03-20
Attending: FAMILY MEDICINE
Payer: COMMERCIAL

## 2025-03-20 DIAGNOSIS — I10 HYPERTENSION, UNSPECIFIED TYPE: ICD-10-CM

## 2025-03-20 DIAGNOSIS — E03.9 HYPOTHYROIDISM, UNSPECIFIED TYPE: ICD-10-CM

## 2025-03-20 DIAGNOSIS — E78.5 HYPERLIPIDEMIA, UNSPECIFIED HYPERLIPIDEMIA TYPE: ICD-10-CM

## 2025-03-20 LAB
ALBUMIN SERPL BCP-MCNC: 3.7 G/DL (ref 3.5–5.2)
ALP SERPL-CCNC: 85 U/L (ref 40–150)
ALT SERPL W/O P-5'-P-CCNC: 31 U/L (ref 10–44)
ANION GAP SERPL CALC-SCNC: 12 MMOL/L (ref 8–16)
AST SERPL-CCNC: 24 U/L (ref 10–40)
BASOPHILS # BLD AUTO: 0.03 K/UL (ref 0–0.2)
BASOPHILS NFR BLD: 0.4 % (ref 0–1.9)
BILIRUB SERPL-MCNC: 0.7 MG/DL (ref 0.1–1)
BUN SERPL-MCNC: 13 MG/DL (ref 6–20)
CALCIUM SERPL-MCNC: 9.4 MG/DL (ref 8.7–10.5)
CHLORIDE SERPL-SCNC: 103 MMOL/L (ref 95–110)
CHOLEST SERPL-MCNC: 163 MG/DL (ref 120–199)
CHOLEST/HDLC SERPL: 2.4 {RATIO} (ref 2–5)
CO2 SERPL-SCNC: 27 MMOL/L (ref 23–29)
CREAT SERPL-MCNC: 0.6 MG/DL (ref 0.5–1.4)
DIFFERENTIAL METHOD BLD: ABNORMAL
EOSINOPHIL # BLD AUTO: 0.1 K/UL (ref 0–0.5)
EOSINOPHIL NFR BLD: 1.1 % (ref 0–8)
ERYTHROCYTE [DISTWIDTH] IN BLOOD BY AUTOMATED COUNT: 16.8 % (ref 11.5–14.5)
EST. GFR  (NO RACE VARIABLE): >60 ML/MIN/1.73 M^2
ESTIMATED AVG GLUCOSE: 128 MG/DL (ref 68–131)
GLUCOSE SERPL-MCNC: 108 MG/DL (ref 70–110)
HBA1C MFR BLD: 6.1 % (ref 4–5.6)
HCT VFR BLD AUTO: 37.1 % (ref 37–48.5)
HDLC SERPL-MCNC: 68 MG/DL (ref 40–75)
HDLC SERPL: 41.7 % (ref 20–50)
HGB BLD-MCNC: 11.6 G/DL (ref 12–16)
IMM GRANULOCYTES # BLD AUTO: 0.01 K/UL (ref 0–0.04)
IMM GRANULOCYTES NFR BLD AUTO: 0.1 % (ref 0–0.5)
LDLC SERPL CALC-MCNC: 80.2 MG/DL (ref 63–159)
LYMPHOCYTES # BLD AUTO: 1.5 K/UL (ref 1–4.8)
LYMPHOCYTES NFR BLD: 20.6 % (ref 18–48)
MCH RBC QN AUTO: 26.3 PG (ref 27–31)
MCHC RBC AUTO-ENTMCNC: 31.3 G/DL (ref 32–36)
MCV RBC AUTO: 84 FL (ref 82–98)
MONOCYTES # BLD AUTO: 0.8 K/UL (ref 0.3–1)
MONOCYTES NFR BLD: 10.5 % (ref 4–15)
NEUTROPHILS # BLD AUTO: 5 K/UL (ref 1.8–7.7)
NEUTROPHILS NFR BLD: 67.3 % (ref 38–73)
NONHDLC SERPL-MCNC: 95 MG/DL
NRBC BLD-RTO: 0 /100 WBC
PLATELET # BLD AUTO: 265 K/UL (ref 150–450)
PMV BLD AUTO: 10.6 FL (ref 9.2–12.9)
POTASSIUM SERPL-SCNC: 3.4 MMOL/L (ref 3.5–5.1)
PROT SERPL-MCNC: 7.1 G/DL (ref 6–8.4)
RBC # BLD AUTO: 4.41 M/UL (ref 4–5.4)
SODIUM SERPL-SCNC: 142 MMOL/L (ref 136–145)
T4 FREE SERPL-MCNC: 1.19 NG/DL (ref 0.71–1.51)
TRIGL SERPL-MCNC: 74 MG/DL (ref 30–150)
TSH SERPL DL<=0.005 MIU/L-ACNC: 0.22 UIU/ML (ref 0.4–4)
WBC # BLD AUTO: 7.43 K/UL (ref 3.9–12.7)

## 2025-03-20 PROCEDURE — 80053 COMPREHEN METABOLIC PANEL: CPT | Performed by: FAMILY MEDICINE

## 2025-03-20 PROCEDURE — 83036 HEMOGLOBIN GLYCOSYLATED A1C: CPT | Performed by: FAMILY MEDICINE

## 2025-03-20 PROCEDURE — 84439 ASSAY OF FREE THYROXINE: CPT | Performed by: FAMILY MEDICINE

## 2025-03-20 PROCEDURE — 84443 ASSAY THYROID STIM HORMONE: CPT | Performed by: FAMILY MEDICINE

## 2025-03-20 PROCEDURE — 80061 LIPID PANEL: CPT | Performed by: FAMILY MEDICINE

## 2025-03-20 PROCEDURE — 85025 COMPLETE CBC W/AUTO DIFF WBC: CPT | Performed by: FAMILY MEDICINE

## 2025-03-20 PROCEDURE — 36415 COLL VENOUS BLD VENIPUNCTURE: CPT | Mod: PO | Performed by: FAMILY MEDICINE

## 2025-04-07 ENCOUNTER — PATIENT MESSAGE (OUTPATIENT)
Dept: FAMILY MEDICINE | Facility: CLINIC | Age: 50
End: 2025-04-07
Payer: COMMERCIAL

## 2025-04-07 DIAGNOSIS — E03.9 HYPOTHYROIDISM, UNSPECIFIED TYPE: Primary | ICD-10-CM

## 2025-04-07 RX ORDER — LEVOTHYROXINE SODIUM 150 UG/1
150 TABLET ORAL
Qty: 30 TABLET | Refills: 11 | Status: SHIPPED | OUTPATIENT
Start: 2025-04-07 | End: 2026-04-07

## 2025-06-11 ENCOUNTER — ON-DEMAND VIRTUAL (OUTPATIENT)
Dept: URGENT CARE | Facility: CLINIC | Age: 50
End: 2025-06-11
Payer: COMMERCIAL

## 2025-06-11 DIAGNOSIS — N76.0 ACUTE VAGINITIS: Primary | ICD-10-CM

## 2025-06-11 PROCEDURE — 98004 SYNCH AUDIO-VIDEO EST SF 10: CPT | Mod: 95,,, | Performed by: NURSE PRACTITIONER

## 2025-06-11 RX ORDER — METRONIDAZOLE 7.5 MG/G
1 GEL VAGINAL NIGHTLY
Qty: 70 G | Refills: 0 | Status: SHIPPED | OUTPATIENT
Start: 2025-06-11 | End: 2025-06-16

## 2025-06-12 NOTE — PROGRESS NOTES
"Subjective:      Patient ID: Beatriz Neville is a 49 y.o. female.    Vitals:  vitals were not taken for this visit.     Chief Complaint: No chief complaint on file.      Visit Type: TELE AUDIOVISUAL - This visit was conducted virtually based on  subjective information and limited objective exam    Present with the patient at the time of consultation: TELEMED PRESENT WITH PATIENT: None  LOCATION OF PATIENT [facility]  Two patient identifiers used to verify patient- saying out date of birth and full name.       Past Medical History:   Diagnosis Date    Hx of colonic polyps     Hypothyroidism      Past Surgical History:   Procedure Laterality Date    APPENDECTOMY       SECTION      x3    COLONOSCOPY W/ POLYPECTOMY  2019    lt breast bx  2005    cyst removed    THYROIDECTOMY, PARTIAL  1999    removed for benign nodules    TONSILLECTOMY       Review of patient's allergies indicates:  No Known Allergies  Medications Ordered Prior to Encounter[1]  Family History   Problem Relation Name Age of Onset    Diabetes type II Mother      Deep vein thrombosis Mother          likely from PE    Thyroid disease Father      No Known Problems Sister      No Known Problems Brother      Lung cancer Paternal Grandmother      Lung cancer Paternal Grandfather         Medications Ordered                [id number] [place] [address] [place] [address]    Telephone: [phone]   Fax: [phone]   Hours: Open 24 hours                         E-Prescribed (1 of 1)              metroNIDAZOLE (METROGEL) 0.75 % (37.5mg/5 gram) vaginal gel    Sig: Place 1 applicator vaginally every evening. for 5 doses       Start: 25     Quantity: 70 g Refills: 0                           No questionnaires on file.    48 yo female complains of vaginal itching, burning and vaginal odor described as "fishy." Onset 1 week ago. Denies fever/chills. Denies " UTI symptoms including dysuria and urinary frequency.         Genitourinary:  Positive for vaginal discharge and vaginal odor.        Objective:   The physical exam was conducted virtually.    AAO x 3 ; no acute distress noted; appearance normal; mood and behavior normal; thought process normal  Head- normocephalic  Nose- appears normal, no discharge or erythema  Eyes- pupils appear normal in size, no drainage, no erythema  Ears- normal appearing; no discharge, no erythema  Mouth- appears normal  Oropharynx- no erythema, lesions  Lungs- breathing at a normal rate, no acute distress noted  Heart- no reports of tachycardia, palpitations, chest pain  Abdomen- non distended, non tender reported by patient  Skin- warm and dry, no erythema or edema noted by patient or visualized        Assessment:     1. Acute vaginitis        Plan:         Thank you for choosing [facility]!    Our goal in the [facility] is to always provide outstanding medical care. You may receive a survey by mail or e-mail in the next week regarding your experience today. We would greatly appreciate you completing and returning the survey. Your feedback provides us with a way to recognize our staff who provide very good care, and it helps us learn how to improve when your experience was below our aspiration of excellence.         We appreciate you trusting us with your medical care. We hope you feel better soon. We will be happy to take care of you for all of your future medical needs.    You must understand that you've received an Urgent Care treatment only and that you may be released before all your medical problems are known or treated. You, the patient, will arrange for follow up care as instructed.    Follow up with your PCP or specialty clinic as directed in the next 1-2 weeks if not improved or as needed.  You can call [phone] to schedule an appointment with the appropriate provider.    If your  condition worsens we recommend that you receive another evaluation in person, with your primary care provider, urgent care or at the emergency room immediately or contact your primary medical clinics after hours call service to discuss your concerns.         Acute vaginitis  -     metroNIDAZOLE (METROGEL) 0.75 % (37.5mg/5 gram) vaginal gel; Place 1 applicator vaginally every evening. for 5 doses  Dispense: 70 g; Refill: 0                          [1]   Current Outpatient Medications on File Prior to Visit   Medication Sig Dispense Refill    fluticasone (FLONASE) 50 mcg/actuation nasal spray One to two sprays each nostril daily 16 g 1    hydroCHLOROthiazide (HYDRODIURIL) 25 MG tablet Take 1 tablet (25 mg total) by mouth once daily. 90 tablet 1    levothyroxine (SYNTHROID) 150 MCG tablet Take 1 tablet (150 mcg total) by mouth before breakfast. 30 tablet 11    loratadine (CLARITIN) 10 mg tablet Take 1 tablet (10 mg total) by mouth once daily. (Patient taking differently: Take 10 mg by mouth daily as needed.) 30 tablet 0    losartan (COZAAR) 100 MG tablet Take 1 tablet (100 mg total) by mouth once daily. 90 tablet 1    metoprolol succinate (TOPROL-XL) 25 MG 24 hr tablet Take 1 tablet (25 mg total) by mouth once daily. 90 tablet 1    potassium chloride (MICRO-K) 10 MEQ CpSR Take by mouth.      pravastatin (PRAVACHOL) 40 MG tablet Take 40 mg by mouth once daily.       No current facility-administered medications on file prior to visit.

## 2025-07-08 ENCOUNTER — TELEPHONE (OUTPATIENT)
Dept: ENDOSCOPY | Facility: HOSPITAL | Age: 50
End: 2025-07-08
Payer: COMMERCIAL

## 2025-07-08 NOTE — TELEPHONE ENCOUNTER
Contacted the patient to schedule an endoscopy procedure(s) Colonoscopy. The patient did not answer the call, voice message left requesting a call back to [phone]. Follow up Endoscopy outreach scheduled in one week.

## 2025-07-16 ENCOUNTER — CLINICAL SUPPORT (OUTPATIENT)
Dept: ENDOSCOPY | Facility: HOSPITAL | Age: 50
End: 2025-07-16
Attending: FAMILY MEDICINE
Payer: COMMERCIAL

## 2025-07-16 VITALS — BODY MASS INDEX: 35.85 KG/M2 | HEIGHT: 64 IN | WEIGHT: 210 LBS

## 2025-07-16 DIAGNOSIS — Z12.11 SPECIAL SCREENING FOR MALIGNANT NEOPLASMS, COLON: Primary | ICD-10-CM

## 2025-07-16 DIAGNOSIS — Z12.11 COLON CANCER SCREENING: ICD-10-CM

## 2025-07-16 DIAGNOSIS — Z12.11 SCREENING FOR COLON CANCER: ICD-10-CM

## 2025-07-16 RX ORDER — SODIUM, POTASSIUM,MAG SULFATES 17.5-3.13G
1 SOLUTION, RECONSTITUTED, ORAL ORAL DAILY
Qty: 1 KIT | Refills: 0 | Status: SHIPPED | OUTPATIENT
Start: 2025-09-08 | End: 2025-09-10

## 2025-07-16 NOTE — PATIENT INSTRUCTIONS
Colonoscopy Procedure Prep Instructions      Date of procedure: 09/15/2025 Arrive at: 06:30AM    Location of Department:   [facility] [address] [place] [address]  Take the Elevators to 2nd Floor Endoscopy Procedural Area    As soon as possible:   your prep from pharmacy and over the counter DULCOLAX LAXATIVE TABLETS     On the day before your procedure   What You CAN do:   You may have clear liquids ONLY -see below for list.     Liquids That Are OK to Drink:   Water  Sports drinks (Gatorade, Power-Aid)  Coffee or tea (no cream or nondairy creamer)  Clear juices without pulp (apple, white grape)  Gelatin desserts (no fruit or toppings)  Clear soda (sprite, coke, ginger ale)  Chicken broth (until 12 midnight the night before procedure)      What You CANNOT do:   Do not EAT solid food, drink milk or anything   colored red.  Do not drink alcohol.  Do not take oral medications within 1 hour of starting   each dose of SUPREP.  No gum chewing or candy morning of procedure.      Note:   (Please disregard the insert instructions from pharmacy).  SUPREP Bowel Prep Kit is indicated for cleansing of the colon as a preparation for colonoscopy in adults.   Be sure to tell your doctor about all the medicines you take, including prescription and non-prescription medicines, vitamins, and herbal supplements. SUPREP Bowel Prep Kit may affect how other medicines work.  Medication taken by mouth may not be absorbed properly when taken within 1 hour before the start of each dose of SUPREP Bowel Prep Kit.    It is not uncommon to experience some abdominal cramping, nausea and/or vomiting when taking the prep. If you have nausea and/or vomiting while taking the prep, stop drinking for 20 to 30 minutes then continue.    How to take prep:    SUPREP Bowel Prep Kit is a (2-day) prep.   Both 6-ounce bottles are required for a complete preparation for colonoscopy. Dilute the solution concentrate as directed  prior to use. You must drink water with each dose of SUPREP, and additional water after each dose.    DOSE 1--Day Before Colonoscopy 09/14/2025    Drink at least 6 to 8 glasses of clear liquids from time you wake up until you begin your prep and then continue until bedtime to avoid dehydration.     12:00 pm (NOON) Take four (4) Dulcolax (Bisacodyl) tablets with at least 8 ounces or more of clear liquids.      6:00 pm:    You must complete Steps 1 through 4 using one (1) 6-ounce bottle before going to bed as shown below:    Step 1-Pour ONE (1) 6-ounce bottle of SUPREP liquid into the mixing container.  Step 2-Add cool drinking water to the 16-ounce line on the container and mix.  Step 3-Drink ALL the liquid in the container.  Step 4-You must drink two (2) more 16-ounce containers of water over the next 1 hour.  IMPORTANT: If you experience preparation-related symptoms (for example, nausea, bloating, or cramping), stop, or slow the rate of drinking the additional water until your symptoms decrease.    DOSE 2--Day of the Colonoscopy 09/15/2025 at 2-3 AM.    For this dose, repeat Steps 1 through 4 shown above using the other 6-ounce bottle.   You may continue drinking water/clear liquids until   4 hours before your colonoscopy or as directed by the scheduling nurse  03:30AM.    For information about your procedure, two (2) things to view prior to colonoscopy:  Please watch this informational video. It is important to watch this animated consent video prior to your arrival. If you haven't watched the video prior to arriving, you are required to watch it during admission which can causes delays.    Options for viewing:   Using a keyboard:  press and hold the control tab (Ctrl) and left mouse click to follow links.           Colonoscopy Instructional Video                                                                                   OR    Type link address into your web browser's address  bar:  [email or web address]      Educational Booklet with pictures:      Colonoscopy Prep - Liquid      Comments:            IMPORTANT INFORMATION TO KNOW BEFORE YOUR PROCEDURE    [facility]     If your procedure requires the administration of anesthesia, it is necessary for a responsible adult to drive you home. The designated adult is strongly encouraged to remain in the endoscopy area until the patient is discharged. (Medical Transportation, Uber, Lyft, Taxi, etc. may ONLY be used if a responsible adult is present to accompany you home. The responsible adult CANNOT be the  of the service.)     person must be available to return to pick you up within 15 minutes of being notified of discharge.     Please bring a picture ID, insurance card, and copayment.     Take Medications as directed below:      If you begin taking any blood thinning medications, injectable weight loss/diabetes medications (other than insulin) , Adipex (Phentermine) , please contact the endoscopy scheduling department listed below as soon as possible.    If you are diabetic see the attached instruction sheet regarding your medication.     If you take HEART, BLOOD PRESSURE, SEIZURE, PAIN, LUNG (including inhalers/nebulizers), ANTI-REJECTION (transplant patients), or PSYCHIATRIC medications, please take at your regular times with a sip of water or as directed by the scheduling nurse.     Important contact information:    Endoscopy [phone] Hours of operation Monday-Friday 8:00-4:30pm.    Questions about insurance or financial obligations call [phone] or [phone].    If you have questions regarding the prep or need to reschedule, please call [phone]. After hours questions requiring immediate assistance, contact [facility] nurse line at [phone] or [phone].   NOTE:     On occasion, unforeseen circumstances may cause a delay in your  procedure start time. We respect your time and appreciate your patience during these circumstances.      Comments:

## 2025-07-16 NOTE — PROGRESS NOTES
Patient is scheduled for a Colonoscopy on 09/15/2025 with Dr. ISIS Milner  Referral for procedure from [facility] appointment

## 2025-08-29 ENCOUNTER — ON-DEMAND VIRTUAL (OUTPATIENT)
Dept: URGENT CARE | Facility: CLINIC | Age: 50
End: 2025-08-29
Payer: COMMERCIAL

## 2025-08-29 DIAGNOSIS — B37.9 YEAST INFECTION: Primary | ICD-10-CM

## 2025-08-29 RX ORDER — FLUCONAZOLE 150 MG/1
150 TABLET ORAL DAILY
Qty: 2 TABLET | Refills: 0 | Status: SHIPPED | OUTPATIENT
Start: 2025-08-29 | End: 2025-08-31

## 2025-09-04 DIAGNOSIS — I10 HYPERTENSION, UNSPECIFIED TYPE: ICD-10-CM

## 2025-09-04 RX ORDER — LOSARTAN POTASSIUM 100 MG/1
100 TABLET ORAL DAILY
Qty: 90 TABLET | Refills: 1 | Status: SHIPPED | OUTPATIENT
Start: 2025-09-04

## 2025-09-05 ENCOUNTER — OFFICE VISIT (OUTPATIENT)
Dept: OBSTETRICS AND GYNECOLOGY | Facility: CLINIC | Age: 50
End: 2025-09-05
Payer: COMMERCIAL

## 2025-09-05 VITALS — DIASTOLIC BLOOD PRESSURE: 90 MMHG | WEIGHT: 220 LBS | BODY MASS INDEX: 37.77 KG/M2 | SYSTOLIC BLOOD PRESSURE: 140 MMHG

## 2025-09-05 DIAGNOSIS — Z12.11 SCREENING FOR COLON CANCER: ICD-10-CM

## 2025-09-05 DIAGNOSIS — Z01.419 ENCOUNTER FOR GYNECOLOGICAL EXAMINATION WITHOUT ABNORMAL FINDING: Primary | ICD-10-CM

## 2025-09-05 DIAGNOSIS — Z12.31 BREAST CANCER SCREENING BY MAMMOGRAM: ICD-10-CM

## 2025-09-05 PROCEDURE — 99999 PR PBB SHADOW E&M-EST. PATIENT-LVL III: CPT | Mod: PBBFAC,,, | Performed by: OBSTETRICS & GYNECOLOGY
